# Patient Record
Sex: FEMALE | Race: BLACK OR AFRICAN AMERICAN | NOT HISPANIC OR LATINO | URBAN - METROPOLITAN AREA
[De-identification: names, ages, dates, MRNs, and addresses within clinical notes are randomized per-mention and may not be internally consistent; named-entity substitution may affect disease eponyms.]

---

## 2022-07-14 ENCOUNTER — LAB OUTSIDE AN ENCOUNTER (OUTPATIENT)
Dept: URBAN - METROPOLITAN AREA CLINIC 105 | Facility: CLINIC | Age: 52
End: 2022-07-14

## 2022-07-14 ENCOUNTER — WEB ENCOUNTER (OUTPATIENT)
Dept: URBAN - METROPOLITAN AREA CLINIC 105 | Facility: CLINIC | Age: 52
End: 2022-07-14

## 2022-07-14 ENCOUNTER — OFFICE VISIT (OUTPATIENT)
Dept: URBAN - METROPOLITAN AREA CLINIC 105 | Facility: CLINIC | Age: 52
End: 2022-07-14
Payer: MEDICARE

## 2022-07-14 VITALS
DIASTOLIC BLOOD PRESSURE: 101 MMHG | TEMPERATURE: 96.4 F | WEIGHT: 145.2 LBS | HEART RATE: 81 BPM | SYSTOLIC BLOOD PRESSURE: 158 MMHG | BODY MASS INDEX: 28.51 KG/M2 | HEIGHT: 60 IN

## 2022-07-14 DIAGNOSIS — Z90.49 HISTORY OF RESECTION OF SMALL BOWEL: ICD-10-CM

## 2022-07-14 DIAGNOSIS — Z87.19 HISTORY OF RECTAL BLEEDING: ICD-10-CM

## 2022-07-14 DIAGNOSIS — R10.84 GENERALIZED ABDOMINAL PAIN: ICD-10-CM

## 2022-07-14 DIAGNOSIS — Z87.11 H/O PEPTIC ULCER: ICD-10-CM

## 2022-07-14 PROCEDURE — 99204 OFFICE O/P NEW MOD 45 MIN: CPT | Performed by: INTERNAL MEDICINE

## 2022-07-14 PROCEDURE — 99244 OFF/OP CNSLTJ NEW/EST MOD 40: CPT | Performed by: INTERNAL MEDICINE

## 2022-07-14 RX ORDER — LISINOPRIL AND HYDROCHLOROTHIAZIDE 25; 20 MG/1; MG/1
TABLET ORAL
Qty: 90 TABLET | Status: ACTIVE | COMMUNITY

## 2022-07-14 RX ORDER — HYDROCODONE BITARTRATE AND ACETAMINOPHEN 10; 325 MG/1; MG/1
TABLET ORAL
Qty: 80 TABLET | Status: ACTIVE | COMMUNITY

## 2022-07-14 RX ORDER — ALBUTEROL SULFATE 108 UG/1
INHALANT RESPIRATORY (INHALATION)
Qty: 6.7 GRAM | Status: ACTIVE | COMMUNITY

## 2022-07-14 RX ORDER — TIZANIDINE HYDROCHLORIDE 4 MG/1
CAPSULE ORAL
Qty: 0 | Refills: 0 | Status: ON HOLD | COMMUNITY
Start: 1900-01-01

## 2022-07-14 RX ORDER — CYCLOBENZAPRINE HYDROCHLORIDE 5 MG/1
TABLET, FILM COATED ORAL
Qty: 270 TABLET | Status: ACTIVE | COMMUNITY

## 2022-07-14 RX ORDER — CALCIUM CITRATE/VITAMIN D3 315MG-6.25
1 TABLET TABLET ORAL TWICE A DAY
Status: ACTIVE | COMMUNITY

## 2022-07-14 RX ORDER — SUVOREXANT 10 MG/1
TAKE ONE TABLET BY MOUTH AT BEDTIME TABLET, FILM COATED ORAL
Qty: 30 UNSPECIFIED | Refills: 0 | Status: ACTIVE | COMMUNITY

## 2022-07-14 RX ORDER — SERTRALINE HYDROCHLORIDE 50 MG/1
TABLET, FILM COATED ORAL
Qty: 90 TABLET | Status: ACTIVE | COMMUNITY

## 2022-07-14 RX ORDER — EPINEPHRINE 0.3 MG/.3ML
INJECTION INTRAMUSCULAR; SUBCUTANEOUS
Qty: 2 EACH | Status: ACTIVE | COMMUNITY

## 2022-07-14 NOTE — HPI-TODAY'S VISIT:
7/14/22 50 yo lady pt of DR Miriam Chacon referred for abd pain. A copy of this note will be sent to her.  I have reviewed multiple scope reports from the past Pt has h/o SB resection x 2 in 1993 and 2006 for leiomyoma. Her last EGD in 2016 showed possible choledochoduodenostomy in duodenal bulb. Small HH. c/o abdominal pain for months. Pain is periumbilical and lower abdominal/ Sometimes pain comes together and sometimes seperately. Assoc with nausea, bloating. Usually a cramping sensation. says she had an US and L ovary could not be found as intestines were covering.  BMs go from constiapted to runny.  When constipated it takes a while to evacuate.  Intermittent rare blood in stool. Last colonoscopy was 2 years ago done at [place] - does not recall name of MD. Told did not need another one for 10 years. No fhx of colon CA.  Does not use nsaids. Takes hydrocodone for months for joint pains. multiple spine surgeries.

## 2022-07-20 ENCOUNTER — CLAIMS CREATED FROM THE CLAIM WINDOW (OUTPATIENT)
Dept: URBAN - METROPOLITAN AREA CLINIC 4 | Facility: CLINIC | Age: 52
End: 2022-07-20
Payer: MEDICARE

## 2022-07-20 ENCOUNTER — OFFICE VISIT (OUTPATIENT)
Dept: URBAN - METROPOLITAN AREA SURGERY CENTER 16 | Facility: SURGERY CENTER | Age: 52
End: 2022-07-20
Payer: MEDICARE

## 2022-07-20 ENCOUNTER — LAB OUTSIDE AN ENCOUNTER (OUTPATIENT)
Dept: URBAN - METROPOLITAN AREA CLINIC 92 | Facility: CLINIC | Age: 52
End: 2022-07-20

## 2022-07-20 ENCOUNTER — TELEPHONE ENCOUNTER (OUTPATIENT)
Dept: URBAN - METROPOLITAN AREA CLINIC 92 | Facility: CLINIC | Age: 52
End: 2022-07-20

## 2022-07-20 DIAGNOSIS — R10.33 ABDOMINAL PAIN, ACUTE, PERIUMBILICAL: ICD-10-CM

## 2022-07-20 DIAGNOSIS — K31.89 OTHER DISEASES OF STOMACH AND DUODENUM: ICD-10-CM

## 2022-07-20 PROCEDURE — 88312 SPECIAL STAINS GROUP 1: CPT | Performed by: PATHOLOGY

## 2022-07-20 PROCEDURE — 43239 EGD BIOPSY SINGLE/MULTIPLE: CPT | Performed by: INTERNAL MEDICINE

## 2022-07-20 PROCEDURE — 88305 TISSUE EXAM BY PATHOLOGIST: CPT | Performed by: PATHOLOGY

## 2022-07-20 PROCEDURE — G8907 PT DOC NO EVENTS ON DISCHARG: HCPCS | Performed by: INTERNAL MEDICINE

## 2022-07-20 RX ORDER — SERTRALINE HYDROCHLORIDE 50 MG/1
TABLET, FILM COATED ORAL
Qty: 90 TABLET | Status: ACTIVE | COMMUNITY

## 2022-07-20 RX ORDER — SUVOREXANT 10 MG/1
TAKE ONE TABLET BY MOUTH AT BEDTIME TABLET, FILM COATED ORAL
Qty: 30 UNSPECIFIED | Refills: 0 | Status: ACTIVE | COMMUNITY

## 2022-07-20 RX ORDER — LISINOPRIL AND HYDROCHLOROTHIAZIDE 25; 20 MG/1; MG/1
TABLET ORAL
Qty: 90 TABLET | Status: ACTIVE | COMMUNITY

## 2022-07-20 RX ORDER — ALBUTEROL SULFATE 108 UG/1
INHALANT RESPIRATORY (INHALATION)
Qty: 6.7 GRAM | Status: ACTIVE | COMMUNITY

## 2022-07-20 RX ORDER — TIZANIDINE HYDROCHLORIDE 4 MG/1
CAPSULE ORAL
Qty: 0 | Refills: 0 | Status: ON HOLD | COMMUNITY
Start: 1900-01-01

## 2022-07-20 RX ORDER — EPINEPHRINE 0.3 MG/.3ML
INJECTION INTRAMUSCULAR; SUBCUTANEOUS
Qty: 2 EACH | Status: ACTIVE | COMMUNITY

## 2022-07-20 RX ORDER — CALCIUM CITRATE/VITAMIN D3 315MG-6.25
1 TABLET TABLET ORAL TWICE A DAY
Status: ACTIVE | COMMUNITY

## 2022-07-20 RX ORDER — CYCLOBENZAPRINE HYDROCHLORIDE 5 MG/1
TABLET, FILM COATED ORAL
Qty: 270 TABLET | Status: ACTIVE | COMMUNITY

## 2022-07-20 RX ORDER — HYDROCODONE BITARTRATE AND ACETAMINOPHEN 10; 325 MG/1; MG/1
TABLET ORAL
Qty: 80 TABLET | Status: ACTIVE | COMMUNITY

## 2022-11-03 ENCOUNTER — LAB OUTSIDE AN ENCOUNTER (OUTPATIENT)
Dept: URBAN - METROPOLITAN AREA CLINIC 17 | Facility: CLINIC | Age: 52
End: 2022-11-03

## 2022-11-03 ENCOUNTER — OFFICE VISIT (OUTPATIENT)
Dept: URBAN - METROPOLITAN AREA CLINIC 17 | Facility: CLINIC | Age: 52
End: 2022-11-03
Payer: MEDICARE

## 2022-11-03 VITALS
WEIGHT: 152 LBS | BODY MASS INDEX: 29.84 KG/M2 | HEIGHT: 60 IN | SYSTOLIC BLOOD PRESSURE: 148 MMHG | TEMPERATURE: 97.5 F | HEART RATE: 98 BPM | DIASTOLIC BLOOD PRESSURE: 98 MMHG

## 2022-11-03 DIAGNOSIS — R10.84 GENERALIZED ABDOMINAL PAIN: ICD-10-CM

## 2022-11-03 DIAGNOSIS — R93.89 ABNORMAL X-RAY: ICD-10-CM

## 2022-11-03 DIAGNOSIS — Z87.11 H/O PEPTIC ULCER: ICD-10-CM

## 2022-11-03 DIAGNOSIS — I10 ESSENTIAL HYPERTENSION: ICD-10-CM

## 2022-11-03 DIAGNOSIS — Z87.19 HISTORY OF RECTAL BLEEDING: ICD-10-CM

## 2022-11-03 DIAGNOSIS — F11.90 NARCOTIC DRUG USE: ICD-10-CM

## 2022-11-03 DIAGNOSIS — Z90.49 HISTORY OF RESECTION OF SMALL BOWEL: ICD-10-CM

## 2022-11-03 PROCEDURE — 99214 OFFICE O/P EST MOD 30 MIN: CPT | Performed by: INTERNAL MEDICINE

## 2022-11-03 RX ORDER — SUVOREXANT 10 MG/1
TAKE ONE TABLET BY MOUTH AT BEDTIME TABLET, FILM COATED ORAL
Qty: 30 UNSPECIFIED | Refills: 0 | Status: ON HOLD | COMMUNITY

## 2022-11-03 RX ORDER — ALBUTEROL SULFATE 108 UG/1
INHALANT RESPIRATORY (INHALATION)
Qty: 6.7 GRAM | Status: ACTIVE | COMMUNITY

## 2022-11-03 RX ORDER — HYDROCODONE BITARTRATE AND ACETAMINOPHEN 10; 325 MG/1; MG/1
TABLET ORAL
Qty: 80 TABLET | Status: ACTIVE | COMMUNITY

## 2022-11-03 RX ORDER — CYCLOBENZAPRINE HYDROCHLORIDE 5 MG/1
TABLET, FILM COATED ORAL
Qty: 270 TABLET | Status: ACTIVE | COMMUNITY

## 2022-11-03 RX ORDER — EPINEPHRINE 0.3 MG/.3ML
INJECTION INTRAMUSCULAR; SUBCUTANEOUS
Qty: 2 EACH | Status: ACTIVE | COMMUNITY

## 2022-11-03 RX ORDER — SERTRALINE HYDROCHLORIDE 50 MG/1
TABLET, FILM COATED ORAL
Qty: 90 TABLET | Status: ACTIVE | COMMUNITY

## 2022-11-03 RX ORDER — TIZANIDINE HYDROCHLORIDE 4 MG/1
CAPSULE ORAL
Qty: 0 | Refills: 0 | Status: ON HOLD | COMMUNITY
Start: 1900-01-01

## 2022-11-03 RX ORDER — CALCIUM CITRATE/VITAMIN D3 315MG-6.25
1 TABLET TABLET ORAL TWICE A DAY
Status: ACTIVE | COMMUNITY

## 2022-11-03 RX ORDER — LISINOPRIL AND HYDROCHLOROTHIAZIDE 25; 20 MG/1; MG/1
TABLET ORAL
Qty: 90 TABLET | Status: ACTIVE | COMMUNITY

## 2022-11-03 NOTE — HPI-TODAY'S VISIT:
7/14/22 50 yo lady pt of DR Miriam Chacon referred for abd pain. A copy of this note will be sent to her.  I have reviewed multiple scope reports from the past Pt has h/o SB resection x 2 in 1993 and 2006 for leiomyoma. Her last EGD in 2016 showed possible choledochoduodenostomy in duodenal bulb. Small HH. c/o abdominal pain for months. Pain is periumbilical and lower abdominal/ Sometimes pain comes together and sometimes seperately. Assoc with nausea, bloating. Usually a cramping sensation. says she had an US and L ovary could not be found as intestines were covering.  BMs go from constiapted to runny.  When constipated it takes a while to evacuate.  Intermittent rare blood in stool. Last colonoscopy was 2 years ago done at [place] - does not recall name of MD. Told did not need another one for 10 years. No fhx of colon CA.  Does not use nsaids. Takes hydrocodone for months for joint pains. multiple spine surgeries.  11/3/22 Here for f/u visit Discussed 2019 EGD/colonoscopy as well as EGD in 7/2022. Also discussed KUB results and UGI series.  Pt was in ER yesterday after having drinks and having nausea. Labs wnl. resolved.  She thinks she has PNA and is to see her PCP. She says she is here because she had an x ray by her back MD Dr Jaylon Hannah in Aug or sept and saw "a lot of white spots in my stomach area and he said what is that?" Reviewed her UGI series  film showing DJD and mild fecal loading. She is taking miralax and metamucil for constipation and it helping.  Reviewed KUB from 7/2022 and UGI series from 8/2022.  No abdominal pain and having regular BMs.

## 2022-12-16 ENCOUNTER — OFFICE VISIT (OUTPATIENT)
Dept: URBAN - METROPOLITAN AREA CLINIC 105 | Facility: CLINIC | Age: 52
End: 2022-12-16

## 2023-01-09 ENCOUNTER — OUT OF OFFICE VISIT (OUTPATIENT)
Dept: URBAN - METROPOLITAN AREA MEDICAL CENTER 16 | Facility: MEDICAL CENTER | Age: 53
End: 2023-01-09
Payer: MEDICARE

## 2023-01-09 DIAGNOSIS — R10.13 ABDOMINAL DISCOMFORT, EPIGASTRIC: ICD-10-CM

## 2023-01-09 DIAGNOSIS — K80.20 ASYMPTOMATIC CHOLELITHIASIS: ICD-10-CM

## 2023-01-09 PROCEDURE — 99222 1ST HOSP IP/OBS MODERATE 55: CPT

## 2023-01-09 PROCEDURE — G8427 DOCREV CUR MEDS BY ELIG CLIN: HCPCS

## 2023-01-10 ENCOUNTER — OUT OF OFFICE VISIT (OUTPATIENT)
Dept: URBAN - METROPOLITAN AREA MEDICAL CENTER 16 | Facility: MEDICAL CENTER | Age: 53
End: 2023-01-10
Payer: MEDICARE

## 2023-01-10 DIAGNOSIS — R10.13 ABDOMINAL DISCOMFORT, EPIGASTRIC: ICD-10-CM

## 2023-01-10 DIAGNOSIS — R11.2 ACUTE NAUSEA WITH NONBILIOUS VOMITING: ICD-10-CM

## 2023-01-10 PROCEDURE — 43235 EGD DIAGNOSTIC BRUSH WASH: CPT | Performed by: INTERNAL MEDICINE

## 2023-02-16 ENCOUNTER — OFFICE VISIT (OUTPATIENT)
Dept: URBAN - METROPOLITAN AREA CLINIC 17 | Facility: CLINIC | Age: 53
End: 2023-02-16
Payer: MEDICARE

## 2023-02-16 VITALS
HEART RATE: 102 BPM | BODY MASS INDEX: 28.47 KG/M2 | HEIGHT: 60 IN | TEMPERATURE: 97.8 F | DIASTOLIC BLOOD PRESSURE: 95 MMHG | WEIGHT: 145 LBS | SYSTOLIC BLOOD PRESSURE: 146 MMHG

## 2023-02-16 DIAGNOSIS — Z90.49 HISTORY OF RESECTION OF SMALL BOWEL: ICD-10-CM

## 2023-02-16 DIAGNOSIS — Z87.19 HISTORY OF RECTAL BLEEDING: ICD-10-CM

## 2023-02-16 DIAGNOSIS — I10 ESSENTIAL HYPERTENSION: ICD-10-CM

## 2023-02-16 DIAGNOSIS — Z87.11 H/O PEPTIC ULCER: ICD-10-CM

## 2023-02-16 DIAGNOSIS — R93.89 ABNORMAL X-RAY: ICD-10-CM

## 2023-02-16 DIAGNOSIS — F11.90 NARCOTIC DRUG USE: ICD-10-CM

## 2023-02-16 DIAGNOSIS — R10.84 GENERALIZED ABDOMINAL PAIN: ICD-10-CM

## 2023-02-16 PROBLEM — 59621000: Status: ACTIVE | Noted: 2022-07-14

## 2023-02-16 PROBLEM — 235919008: Status: ACTIVE | Noted: 2023-02-16

## 2023-02-16 PROBLEM — 347861000119105: Status: ACTIVE | Noted: 2022-07-14

## 2023-02-16 PROBLEM — 168542003: Status: ACTIVE | Noted: 2022-11-03

## 2023-02-16 PROCEDURE — 99214 OFFICE O/P EST MOD 30 MIN: CPT | Performed by: INTERNAL MEDICINE

## 2023-02-16 RX ORDER — HYDROCODONE BITARTRATE AND ACETAMINOPHEN 10; 325 MG/1; MG/1
TABLET ORAL
Qty: 80 TABLET | Status: ACTIVE | COMMUNITY

## 2023-02-16 RX ORDER — CALCIUM CITRATE/VITAMIN D3 315MG-6.25
1 TABLET TABLET ORAL TWICE A DAY
Status: ACTIVE | COMMUNITY

## 2023-02-16 RX ORDER — EPINEPHRINE 0.3 MG/.3ML
INJECTION INTRAMUSCULAR; SUBCUTANEOUS
Qty: 2 EACH | Status: ACTIVE | COMMUNITY

## 2023-02-16 RX ORDER — TIZANIDINE HYDROCHLORIDE 4 MG/1
CAPSULE ORAL
Qty: 0 | Refills: 0 | Status: ON HOLD | COMMUNITY
Start: 1900-01-01

## 2023-02-16 RX ORDER — SUVOREXANT 10 MG/1
TAKE ONE TABLET BY MOUTH AT BEDTIME TABLET, FILM COATED ORAL
Qty: 30 UNSPECIFIED | Refills: 0 | Status: ON HOLD | COMMUNITY

## 2023-02-16 RX ORDER — LISINOPRIL AND HYDROCHLOROTHIAZIDE 25; 20 MG/1; MG/1
TABLET ORAL
Qty: 90 TABLET | Status: ACTIVE | COMMUNITY

## 2023-02-16 RX ORDER — SERTRALINE HYDROCHLORIDE 50 MG/1
TABLET, FILM COATED ORAL
Qty: 90 TABLET | Status: ACTIVE | COMMUNITY

## 2023-02-16 RX ORDER — ALBUTEROL SULFATE 108 UG/1
INHALANT RESPIRATORY (INHALATION)
Qty: 6.7 GRAM | Status: ACTIVE | COMMUNITY

## 2023-02-16 RX ORDER — CYCLOBENZAPRINE HYDROCHLORIDE 5 MG/1
TABLET, FILM COATED ORAL
Qty: 270 TABLET | Status: ACTIVE | COMMUNITY

## 2023-02-16 NOTE — HPI-TODAY'S VISIT:
7/14/22 52 yo lady pt of DR Miriam Chacon referred for abd pain. A copy of this note will be sent to her.  I have reviewed multiple scope reports from the past Pt has h/o SB resection x 2 in 1993 and 2006 for leiomyoma. Her last EGD in 2016 showed possible choledochoduodenostomy in duodenal bulb. Small HH. c/o abdominal pain for months. Pain is periumbilical and lower abdominal/ Sometimes pain comes together and sometimes seperately. Assoc with nausea, bloating. Usually a cramping sensation. says she had an US and L ovary could not be found as intestines were covering.  BMs go from constiapted to runny.  When constipated it takes a while to evacuate.  Intermittent rare blood in stool. Last colonoscopy was 2 years ago done at [place] - does not recall name of MD. Told did not need another one for 10 years. No fhx of colon CA.  Does not use nsaids. Takes hydrocodone for months for joint pains. multiple spine surgeries.  11/3/22 Here for f/u visit Discussed 2019 EGD/colonoscopy as well as EGD in 7/2022. Also discussed KUB results and UGI series.  Pt was in ER yesterday after having drinks and having nausea. Labs wnl. resolved.  She thinks she has PNA and is to see her PCP. She says she is here because she had an x ray by her back MD Dr Jaylon Hannah in Aug or sept and saw "a lot of white spots in my stomach area and he said what is that?" Reviewed her UGI series  film showing DJD and mild fecal loading. She is taking miralax and metamucil for constipation and it helping.  Reviewed KUB from 7/2022 and UGI series from 8/2022.  No abdominal pain and having regular BMs.  2/16/23 Says she found out what the "spots" were . She was hospitalized and found to have gallstones Says no inflammation so it was not removed.  I discussed gen surgery referral - "I am so tired of being cut on". Says some post prandial abd pain and vomiting with RUQ and epigastric discomfort.  Says BMs are constipated "they have me taking metamucil and miralax".  She then says that if I had been able to review her disc in the office in 11/2022 she would not have ended up in the hospital. I had explained to her at that visit that we do not have capacity to review discs in the office.  I had then reviewed her last KUB and UGI series which did not show what she had described THEN I ordered a repeat KUB and she never completed it.   I went over these with her but she was insistent that "if you had looked at the disc I would not have ended up in the hospital". She states also " I had other things going on which is why I was admitted" . Review of hospital records show she was admitted for an asthma attack.   I reviewed  her hospital course from discharge summary in 1/2023. US showed small gallstone. HIDA scan negative. She was cleared by surgery/GI and to f/u with PCP. Neither GI nor surgery thought her symptoms were related to gallstones/biliary colic. Per dr Dumont surgery :""" Her symptoms and exam are completely different today. She now is complaining of epigastric and RUQ pain with bloating. Yesterday her main complaint was RLQ pain. Overall her initial complaints were not consistent with biliary colic. Review of her U/S only reveals a few stones and some sludge dependently. No radiographic findings to suggest cholecystitis. WBC 21k but patient on scheduled prednisone.  -HIDA ordered to definitively rule out cystic duct obstruction. Would not recommend obtaining EF% with HIDA as her symptoms profile is not consistent with biliary dyskinesia."""  Her HIDA was negative.   I then explained that there is a trust issue here. And that she will be better served with another GI MD and she agreed. She says she will go back to Dr Vázquez.

## 2024-05-04 ENCOUNTER — P2P PATIENT RECORD (OUTPATIENT)
Age: 54
End: 2024-05-04

## 2024-05-08 ENCOUNTER — TELEPHONE ENCOUNTER (OUTPATIENT)
Dept: URBAN - METROPOLITAN AREA CLINIC 17 | Facility: CLINIC | Age: 54
End: 2024-05-08

## 2024-05-10 ENCOUNTER — OFFICE VISIT (OUTPATIENT)
Dept: URBAN - METROPOLITAN AREA CLINIC 84 | Facility: CLINIC | Age: 54
End: 2024-05-10
Payer: COMMERCIAL

## 2024-05-10 ENCOUNTER — DASHBOARD ENCOUNTERS (OUTPATIENT)
Age: 54
End: 2024-05-10

## 2024-05-10 VITALS
DIASTOLIC BLOOD PRESSURE: 96 MMHG | WEIGHT: 153.4 LBS | SYSTOLIC BLOOD PRESSURE: 156 MMHG | TEMPERATURE: 97.2 F | BODY MASS INDEX: 30.12 KG/M2 | HEART RATE: 76 BPM | HEIGHT: 60 IN

## 2024-05-10 DIAGNOSIS — K57.92 DIVERTICULITIS: ICD-10-CM

## 2024-05-10 PROBLEM — 307496006: Status: ACTIVE | Noted: 2024-05-10

## 2024-05-10 PROCEDURE — 99214 OFFICE O/P EST MOD 30 MIN: CPT | Performed by: INTERNAL MEDICINE

## 2024-05-10 RX ORDER — SERTRALINE HYDROCHLORIDE 50 MG/1
TABLET, FILM COATED ORAL
Qty: 90 TABLET | Status: ON HOLD | COMMUNITY

## 2024-05-10 RX ORDER — CIPROFLOXACIN 500 MG/1
TABLET ORAL
Qty: 20 TABLET | Status: ON HOLD | COMMUNITY

## 2024-05-10 RX ORDER — HYDROCODONE BITARTRATE AND ACETAMINOPHEN 5; 325 MG/1; MG/1
TABLET ORAL
Qty: 15 TABLET | Status: ON HOLD | COMMUNITY

## 2024-05-10 RX ORDER — EPINEPHRINE 0.3 MG/.3ML
INJECTION INTRAMUSCULAR; SUBCUTANEOUS
Qty: 2 EACH | Status: ON HOLD | COMMUNITY

## 2024-05-10 RX ORDER — TIZANIDINE HYDROCHLORIDE 4 MG/1
CAPSULE ORAL
Qty: 0 | Refills: 0 | Status: ON HOLD | COMMUNITY
Start: 1900-01-01

## 2024-05-10 RX ORDER — SUMATRIPTAN SUCCINATE 50 MG/1
TAKE ONE TABLET BY MOUTH 2 TIMES PER DAY AT LEAST 2 HOURS BETWEEN DOSES AS NEEDED TABLET ORAL
Qty: 9 UNSPECIFIED | Refills: 0 | Status: ON HOLD | COMMUNITY

## 2024-05-10 RX ORDER — HYDROCODONE BITARTRATE AND ACETAMINOPHEN 10; 325 MG/1; MG/1
TABLET ORAL
Qty: 80 TABLET | Status: ON HOLD | COMMUNITY

## 2024-05-10 RX ORDER — LISINOPRIL AND HYDROCHLOROTHIAZIDE 12.5; 2 MG/1; MG/1
TAKE TWO TABLETS BY MOUTH ONE TIME DAILY TABLET ORAL
Qty: 180 UNSPECIFIED | Refills: 0 | Status: ACTIVE | COMMUNITY

## 2024-05-10 RX ORDER — CALCIUM CITRATE/VITAMIN D3 315MG-6.25
1 TABLET TABLET ORAL TWICE A DAY
Status: ON HOLD | COMMUNITY

## 2024-05-10 RX ORDER — LISINOPRIL AND HYDROCHLOROTHIAZIDE 25; 20 MG/1; MG/1
TABLET ORAL
Qty: 90 TABLET | Status: ON HOLD | COMMUNITY

## 2024-05-10 RX ORDER — TOPIRAMATE 25 MG/1
TAKE ONE TABLET BY MOUTH TWICE A DAY TABLET, FILM COATED ORAL
Qty: 60 UNSPECIFIED | Refills: 0 | Status: ON HOLD | COMMUNITY

## 2024-05-10 RX ORDER — DONEPEZIL HYDROCHLORIDE 5 MG/1
TAKE ONE TABLET BY MOUTH AT BEDTIME TABLET ORAL
Qty: 30 UNSPECIFIED | Refills: 0 | Status: ON HOLD | COMMUNITY

## 2024-05-10 RX ORDER — LISINOPRIL 20 MG/1
TABLET ORAL
Qty: 0 | Refills: 0 | Status: ON HOLD | COMMUNITY
Start: 2016-06-02

## 2024-05-10 RX ORDER — METRONIDAZOLE 500 MG/1
TABLET, FILM COATED ORAL
Qty: 30 TABLET | Status: ON HOLD | COMMUNITY

## 2024-05-10 RX ORDER — ALBUTEROL SULFATE 108 UG/1
INHALANT RESPIRATORY (INHALATION)
Qty: 6.7 GRAM | Status: ON HOLD | COMMUNITY

## 2024-05-10 RX ORDER — SUVOREXANT 10 MG/1
TAKE ONE TABLET BY MOUTH AT BEDTIME TABLET, FILM COATED ORAL
Qty: 30 UNSPECIFIED | Refills: 0 | Status: ON HOLD | COMMUNITY

## 2024-05-10 RX ORDER — CYCLOBENZAPRINE HYDROCHLORIDE 5 MG/1
TABLET, FILM COATED ORAL
Qty: 270 TABLET | Status: ON HOLD | COMMUNITY

## 2024-05-23 ENCOUNTER — TELEPHONE ENCOUNTER (OUTPATIENT)
Dept: URBAN - METROPOLITAN AREA CLINIC 6 | Facility: CLINIC | Age: 54
End: 2024-05-23

## 2024-06-07 ENCOUNTER — TELEPHONE ENCOUNTER (OUTPATIENT)
Dept: URBAN - METROPOLITAN AREA CLINIC 84 | Facility: CLINIC | Age: 54
End: 2024-06-07

## 2024-06-07 ENCOUNTER — TELEPHONE ENCOUNTER (OUTPATIENT)
Dept: URBAN - METROPOLITAN AREA CLINIC 25 | Facility: CLINIC | Age: 54
End: 2024-06-07

## 2024-07-01 ENCOUNTER — OFFICE VISIT (OUTPATIENT)
Dept: URBAN - METROPOLITAN AREA CLINIC 84 | Facility: CLINIC | Age: 54
End: 2024-07-01

## 2024-07-15 ENCOUNTER — OFFICE VISIT (OUTPATIENT)
Dept: URBAN - METROPOLITAN AREA CLINIC 84 | Facility: CLINIC | Age: 54
End: 2024-07-15

## 2024-07-15 RX ORDER — SUMATRIPTAN SUCCINATE 50 MG/1
TAKE ONE TABLET BY MOUTH 2 TIMES PER DAY AT LEAST 2 HOURS BETWEEN DOSES AS NEEDED TABLET ORAL
Qty: 9 UNSPECIFIED | Refills: 0 | Status: ON HOLD | COMMUNITY

## 2024-07-15 RX ORDER — METRONIDAZOLE 500 MG/1
TABLET, FILM COATED ORAL
Qty: 30 TABLET | Status: ON HOLD | COMMUNITY

## 2024-07-15 RX ORDER — TOPIRAMATE 25 MG/1
TAKE ONE TABLET BY MOUTH TWICE A DAY TABLET, FILM COATED ORAL
Qty: 60 UNSPECIFIED | Refills: 0 | Status: ON HOLD | COMMUNITY

## 2024-07-15 RX ORDER — DONEPEZIL HYDROCHLORIDE 5 MG/1
TAKE ONE TABLET BY MOUTH AT BEDTIME TABLET ORAL
Qty: 30 UNSPECIFIED | Refills: 0 | Status: ON HOLD | COMMUNITY

## 2024-07-15 RX ORDER — LISINOPRIL 20 MG/1
TABLET ORAL
Qty: 0 | Refills: 0 | Status: ON HOLD | COMMUNITY
Start: 2016-06-02

## 2024-07-15 RX ORDER — ALBUTEROL SULFATE 108 UG/1
INHALANT RESPIRATORY (INHALATION)
Qty: 6.7 GRAM | Status: ON HOLD | COMMUNITY

## 2024-07-15 RX ORDER — LISINOPRIL AND HYDROCHLOROTHIAZIDE 25; 20 MG/1; MG/1
TABLET ORAL
Qty: 90 TABLET | Status: ON HOLD | COMMUNITY

## 2024-07-15 RX ORDER — HYDROCODONE BITARTRATE AND ACETAMINOPHEN 5; 325 MG/1; MG/1
TABLET ORAL
Qty: 15 TABLET | Status: ON HOLD | COMMUNITY

## 2024-07-15 RX ORDER — CIPROFLOXACIN 500 MG/1
TABLET ORAL
Qty: 20 TABLET | Status: ON HOLD | COMMUNITY

## 2024-07-15 RX ORDER — EPINEPHRINE 0.3 MG/.3ML
INJECTION INTRAMUSCULAR; SUBCUTANEOUS
Qty: 2 EACH | Status: ON HOLD | COMMUNITY

## 2024-07-15 RX ORDER — CALCIUM CITRATE/VITAMIN D3 315MG-6.25
1 TABLET TABLET ORAL TWICE A DAY
Status: ON HOLD | COMMUNITY

## 2024-07-15 RX ORDER — HYDROCODONE BITARTRATE AND ACETAMINOPHEN 10; 325 MG/1; MG/1
TABLET ORAL
Qty: 80 TABLET | Status: ON HOLD | COMMUNITY

## 2024-07-15 RX ORDER — SERTRALINE HYDROCHLORIDE 50 MG/1
TABLET, FILM COATED ORAL
Qty: 90 TABLET | Status: ON HOLD | COMMUNITY

## 2024-07-15 RX ORDER — CYCLOBENZAPRINE HYDROCHLORIDE 5 MG/1
TABLET, FILM COATED ORAL
Qty: 270 TABLET | Status: ON HOLD | COMMUNITY

## 2024-07-15 RX ORDER — LISINOPRIL AND HYDROCHLOROTHIAZIDE 12.5; 2 MG/1; MG/1
TAKE TWO TABLETS BY MOUTH ONE TIME DAILY TABLET ORAL
Qty: 180 UNSPECIFIED | Refills: 0 | Status: ACTIVE | COMMUNITY

## 2024-07-15 RX ORDER — TIZANIDINE HYDROCHLORIDE 4 MG/1
CAPSULE ORAL
Qty: 0 | Refills: 0 | Status: ON HOLD | COMMUNITY
Start: 1900-01-01

## 2024-07-15 RX ORDER — SUVOREXANT 10 MG/1
TAKE ONE TABLET BY MOUTH AT BEDTIME TABLET, FILM COATED ORAL
Qty: 30 UNSPECIFIED | Refills: 0 | Status: ON HOLD | COMMUNITY

## 2024-07-30 ENCOUNTER — OFFICE VISIT (OUTPATIENT)
Dept: URBAN - METROPOLITAN AREA CLINIC 84 | Facility: CLINIC | Age: 54
End: 2024-07-30

## 2024-07-30 RX ORDER — DONEPEZIL HYDROCHLORIDE 5 MG/1
TAKE ONE TABLET BY MOUTH AT BEDTIME TABLET ORAL
Qty: 30 UNSPECIFIED | Refills: 0 | Status: ON HOLD | COMMUNITY

## 2024-07-30 RX ORDER — TIZANIDINE HYDROCHLORIDE 4 MG/1
CAPSULE ORAL
Qty: 0 | Refills: 0 | Status: ON HOLD | COMMUNITY
Start: 1900-01-01

## 2024-07-30 RX ORDER — HYDROCODONE BITARTRATE AND ACETAMINOPHEN 10; 325 MG/1; MG/1
TABLET ORAL
Qty: 80 TABLET | Status: ON HOLD | COMMUNITY

## 2024-07-30 RX ORDER — LISINOPRIL AND HYDROCHLOROTHIAZIDE 25; 20 MG/1; MG/1
TABLET ORAL
Qty: 90 TABLET | Status: ON HOLD | COMMUNITY

## 2024-07-30 RX ORDER — LISINOPRIL 20 MG/1
TABLET ORAL
Qty: 0 | Refills: 0 | Status: ON HOLD | COMMUNITY
Start: 2016-06-02

## 2024-07-30 RX ORDER — TOPIRAMATE 25 MG/1
TAKE ONE TABLET BY MOUTH TWICE A DAY TABLET, FILM COATED ORAL
Qty: 60 UNSPECIFIED | Refills: 0 | Status: ON HOLD | COMMUNITY

## 2024-07-30 RX ORDER — METRONIDAZOLE 500 MG/1
TABLET, FILM COATED ORAL
Qty: 30 TABLET | Status: ON HOLD | COMMUNITY

## 2024-07-30 RX ORDER — CYCLOBENZAPRINE HYDROCHLORIDE 5 MG/1
TABLET, FILM COATED ORAL
Qty: 270 TABLET | Status: ON HOLD | COMMUNITY

## 2024-07-30 RX ORDER — SUVOREXANT 10 MG/1
TAKE ONE TABLET BY MOUTH AT BEDTIME TABLET, FILM COATED ORAL
Qty: 30 UNSPECIFIED | Refills: 0 | Status: ON HOLD | COMMUNITY

## 2024-07-30 RX ORDER — EPINEPHRINE 0.3 MG/.3ML
INJECTION INTRAMUSCULAR; SUBCUTANEOUS
Qty: 2 EACH | Status: ON HOLD | COMMUNITY

## 2024-07-30 RX ORDER — CIPROFLOXACIN 500 MG/1
TABLET ORAL
Qty: 20 TABLET | Status: ON HOLD | COMMUNITY

## 2024-07-30 RX ORDER — SERTRALINE HYDROCHLORIDE 50 MG/1
TABLET, FILM COATED ORAL
Qty: 90 TABLET | Status: ON HOLD | COMMUNITY

## 2024-07-30 RX ORDER — ALBUTEROL SULFATE 108 UG/1
INHALANT RESPIRATORY (INHALATION)
Qty: 6.7 GRAM | Status: ON HOLD | COMMUNITY

## 2024-07-30 RX ORDER — SUMATRIPTAN SUCCINATE 50 MG/1
TAKE ONE TABLET BY MOUTH 2 TIMES PER DAY AT LEAST 2 HOURS BETWEEN DOSES AS NEEDED TABLET ORAL
Qty: 9 UNSPECIFIED | Refills: 0 | Status: ON HOLD | COMMUNITY

## 2024-07-30 RX ORDER — CALCIUM CITRATE/VITAMIN D3 315MG-6.25
1 TABLET TABLET ORAL TWICE A DAY
Status: ON HOLD | COMMUNITY

## 2024-07-30 RX ORDER — HYDROCODONE BITARTRATE AND ACETAMINOPHEN 5; 325 MG/1; MG/1
TABLET ORAL
Qty: 15 TABLET | Status: ON HOLD | COMMUNITY

## 2024-07-30 RX ORDER — LISINOPRIL AND HYDROCHLOROTHIAZIDE 12.5; 2 MG/1; MG/1
TAKE TWO TABLETS BY MOUTH ONE TIME DAILY TABLET ORAL
Qty: 180 UNSPECIFIED | Refills: 0 | Status: ACTIVE | COMMUNITY

## 2024-08-05 ENCOUNTER — OFFICE VISIT (OUTPATIENT)
Dept: URBAN - METROPOLITAN AREA CLINIC 84 | Facility: CLINIC | Age: 54
End: 2024-08-05

## 2024-08-05 RX ORDER — ALBUTEROL SULFATE 108 UG/1
INHALANT RESPIRATORY (INHALATION)
Qty: 6.7 GRAM | COMMUNITY

## 2024-08-05 RX ORDER — FLUCONAZOLE 150 MG/1
TABLET ORAL
Qty: 2 TABLET | Status: ACTIVE | COMMUNITY

## 2024-08-05 RX ORDER — METOCLOPRAMIDE 10 MG/1
TABLET ORAL
Qty: 40 TABLET | Status: ACTIVE | COMMUNITY

## 2024-08-05 RX ORDER — DONEPEZIL HYDROCHLORIDE 5 MG/1
TAKE ONE TABLET BY MOUTH AT BEDTIME TABLET ORAL
Qty: 30 UNSPECIFIED | Refills: 0 | Status: ACTIVE | COMMUNITY

## 2024-08-05 RX ORDER — HYDROCODONE BITARTRATE AND ACETAMINOPHEN 5; 325 MG/1; MG/1
TABLET ORAL
Qty: 15 TABLET | Status: ACTIVE | COMMUNITY

## 2024-08-05 RX ORDER — SERTRALINE 50 MG/1
TAKE ONE TABLET BY MOUTH ONE TIME DAILY TABLET, FILM COATED ORAL
Qty: 60 UNSPECIFIED | Refills: 0 | Status: ACTIVE | COMMUNITY

## 2024-08-05 RX ORDER — SERTRALINE HYDROCHLORIDE 50 MG/1
TABLET, FILM COATED ORAL
Qty: 90 TABLET | COMMUNITY

## 2024-08-05 RX ORDER — TIZANIDINE HYDROCHLORIDE 4 MG/1
CAPSULE ORAL
Qty: 0 | Refills: 0 | COMMUNITY
Start: 1900-01-01

## 2024-08-05 RX ORDER — EPINEPHRINE 0.3 MG/.3ML
INJECTION INTRAMUSCULAR; SUBCUTANEOUS
Qty: 2 EACH | COMMUNITY

## 2024-08-05 RX ORDER — HYDROCODONE BITARTRATE AND ACETAMINOPHEN 10; 325 MG/1; MG/1
TABLET ORAL
Qty: 80 TABLET | COMMUNITY

## 2024-08-05 RX ORDER — METRONIDAZOLE 500 MG/1
TAKE ONE TABLET BY MOUTH THREE TIMES A DAY MORNING, AFTERNOON, BEDTIME TABLET, FILM COATED ORAL
Qty: 30 UNSPECIFIED | Refills: 0 | Status: ACTIVE | COMMUNITY

## 2024-08-05 RX ORDER — TOPIRAMATE 25 MG/1
TAKE ONE TABLET BY MOUTH TWICE A DAY TABLET, FILM COATED ORAL
Qty: 60 UNSPECIFIED | Refills: 0 | COMMUNITY

## 2024-08-05 RX ORDER — CALCIUM CITRATE/VITAMIN D3 315MG-6.25
1 TABLET TABLET ORAL TWICE A DAY
COMMUNITY

## 2024-08-05 RX ORDER — DONEPEZIL HYDROCHLORIDE 5 MG/1
TAKE ONE TABLET BY MOUTH AT BEDTIME TABLET ORAL
Qty: 30 UNSPECIFIED | Refills: 0 | COMMUNITY

## 2024-08-05 RX ORDER — SUMATRIPTAN SUCCINATE 50 MG/1
TAKE ONE TABLET BY MOUTH 2 TIMES PER DAY AT LEAST 2 HOURS BETWEEN DOSES AS NEEDED TABLET ORAL
Qty: 9 UNSPECIFIED | Refills: 0 | COMMUNITY

## 2024-08-05 RX ORDER — CIPROFLOXACIN 500 MG/1
TABLET ORAL
Qty: 20 TABLET | COMMUNITY

## 2024-08-05 RX ORDER — LISINOPRIL AND HYDROCHLOROTHIAZIDE 25; 20 MG/1; MG/1
TABLET ORAL
Qty: 90 TABLET | COMMUNITY

## 2024-08-05 RX ORDER — METRONIDAZOLE 500 MG/1
TABLET, FILM COATED ORAL
Qty: 30 TABLET | COMMUNITY

## 2024-08-05 RX ORDER — CIPROFLOXACIN 500 MG/1
TAKE ONE TABLET BY MOUTH TWICE A DAY FOR 10 DAYS TABLET ORAL
Qty: 20 UNSPECIFIED | Refills: 0 | Status: ACTIVE | COMMUNITY

## 2024-08-05 RX ORDER — SUMATRIPTAN SUCCINATE 50 MG/1
TAKE ONE TABLET BY MOUTH 2 TIMES PER DAY AT LEAST 2 HOURS BETWEEN DOSES AS NEEDED TABLET ORAL
Qty: 9 UNSPECIFIED | Refills: 0 | Status: ACTIVE | COMMUNITY

## 2024-08-05 RX ORDER — METOCLOPRAMIDE 10 MG/1
TAKE ONE TABLET BY MOUTH EVERY 6 HOURS AS NEEDED FOR NAUSEA AND VOMITING TABLET ORAL
Qty: 40 UNSPECIFIED | Refills: 0 | Status: ACTIVE | COMMUNITY

## 2024-08-05 RX ORDER — SERTRALINE HYDROCHLORIDE 25 MG/1
TAKE ONE TABLET BY MOUTH ONE TIME DAILY TABLET, FILM COATED ORAL
Qty: 7 UNSPECIFIED | Refills: 0 | Status: ACTIVE | COMMUNITY

## 2024-08-05 RX ORDER — LISINOPRIL AND HYDROCHLOROTHIAZIDE 12.5; 2 MG/1; MG/1
TAKE TWO TABLETS BY MOUTH ONE TIME DAILY TABLET ORAL
Qty: 180 UNSPECIFIED | Refills: 0 | Status: ACTIVE | COMMUNITY

## 2024-08-05 RX ORDER — SUVOREXANT 10 MG/1
TAKE ONE TABLET BY MOUTH AT BEDTIME TABLET, FILM COATED ORAL
Qty: 30 UNSPECIFIED | Refills: 0 | COMMUNITY

## 2024-08-05 RX ORDER — TOPIRAMATE 25 MG/1
TAKE ONE TABLET BY MOUTH TWICE A DAY TABLET, FILM COATED ORAL
Qty: 60 UNSPECIFIED | Refills: 0 | Status: ACTIVE | COMMUNITY

## 2024-08-05 RX ORDER — CYCLOBENZAPRINE HYDROCHLORIDE 5 MG/1
TABLET, FILM COATED ORAL
Qty: 270 TABLET | COMMUNITY

## 2024-08-05 RX ORDER — LISINOPRIL 20 MG/1
TABLET ORAL
Qty: 0 | Refills: 0 | COMMUNITY
Start: 2016-06-02

## 2024-08-05 RX ORDER — HYDROCODONE BITARTRATE AND ACETAMINOPHEN 5; 325 MG/1; MG/1
TABLET ORAL
Qty: 15 TABLET | COMMUNITY

## 2024-08-05 RX ORDER — FLUCONAZOLE 150 MG/1
TAKE ONE TABLET BY MOUTH NOW THEN REPEAT IN 7 DAYS IF SYMPTOMS PERSIST TABLET ORAL
Qty: 2 UNSPECIFIED | Refills: 0 | Status: ACTIVE | COMMUNITY